# Patient Record
Sex: MALE | Race: NATIVE HAWAIIAN OR OTHER PACIFIC ISLANDER | NOT HISPANIC OR LATINO | Employment: FULL TIME | URBAN - METROPOLITAN AREA
[De-identification: names, ages, dates, MRNs, and addresses within clinical notes are randomized per-mention and may not be internally consistent; named-entity substitution may affect disease eponyms.]

---

## 2020-01-15 ENCOUNTER — TELEPHONE (OUTPATIENT)
Dept: SCHEDULING | Facility: IMAGING CENTER | Age: 45
End: 2020-01-15

## 2020-01-31 ENCOUNTER — APPOINTMENT (OUTPATIENT)
Dept: MEDICAL GROUP | Facility: MEDICAL CENTER | Age: 45
End: 2020-01-31

## 2021-09-26 ENCOUNTER — OFFICE VISIT (OUTPATIENT)
Dept: URGENT CARE | Facility: CLINIC | Age: 46
End: 2021-09-26
Payer: COMMERCIAL

## 2021-09-26 VITALS
RESPIRATION RATE: 16 BRPM | WEIGHT: 203 LBS | SYSTOLIC BLOOD PRESSURE: 150 MMHG | TEMPERATURE: 98.4 F | BODY MASS INDEX: 30.77 KG/M2 | HEIGHT: 68 IN | OXYGEN SATURATION: 98 % | HEART RATE: 70 BPM | DIASTOLIC BLOOD PRESSURE: 90 MMHG

## 2021-09-26 DIAGNOSIS — R05.9 COUGH IN ADULT: ICD-10-CM

## 2021-09-26 DIAGNOSIS — R03.0 ELEVATED BLOOD PRESSURE READING WITHOUT DIAGNOSIS OF HYPERTENSION: ICD-10-CM

## 2021-09-26 DIAGNOSIS — S40.021A ARM BRUISE, RIGHT, INITIAL ENCOUNTER: ICD-10-CM

## 2021-09-26 PROCEDURE — 99203 OFFICE O/P NEW LOW 30 MIN: CPT | Performed by: NURSE PRACTITIONER

## 2021-09-26 ASSESSMENT — ENCOUNTER SYMPTOMS
HEADACHES: 0
WHEEZING: 0
SORE THROAT: 0
CHILLS: 0
VOMITING: 0
NAUSEA: 0
ABDOMINAL PAIN: 0
SHORTNESS OF BREATH: 0
HEMOPTYSIS: 0
SPUTUM PRODUCTION: 0
MYALGIAS: 0
FEVER: 0
COUGH: 1
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
## 2021-10-20 ENCOUNTER — OFFICE VISIT (OUTPATIENT)
Dept: URGENT CARE | Facility: CLINIC | Age: 46
End: 2021-10-20
Payer: COMMERCIAL

## 2021-10-20 VITALS
SYSTOLIC BLOOD PRESSURE: 124 MMHG | HEIGHT: 68 IN | HEART RATE: 74 BPM | BODY MASS INDEX: 30.77 KG/M2 | DIASTOLIC BLOOD PRESSURE: 82 MMHG | OXYGEN SATURATION: 96 % | TEMPERATURE: 98.2 F | RESPIRATION RATE: 14 BRPM | WEIGHT: 203 LBS

## 2021-10-20 DIAGNOSIS — R05.9 COUGH: ICD-10-CM

## 2021-10-20 DIAGNOSIS — J30.2 SEASONAL ALLERGIES: ICD-10-CM

## 2021-10-20 PROCEDURE — 99214 OFFICE O/P EST MOD 30 MIN: CPT | Performed by: PHYSICIAN ASSISTANT

## 2021-10-20 RX ORDER — PREDNISONE 20 MG/1
40 TABLET ORAL DAILY
Qty: 10 TABLET | Refills: 0 | Status: SHIPPED | OUTPATIENT
Start: 2021-10-20 | End: 2021-10-25

## 2021-10-20 RX ORDER — ALBUTEROL SULFATE 90 UG/1
2 AEROSOL, METERED RESPIRATORY (INHALATION) EVERY 6 HOURS PRN
Qty: 8.5 G | Refills: 0 | Status: SHIPPED | OUTPATIENT
Start: 2021-10-20 | End: 2021-11-02

## 2021-10-20 ASSESSMENT — ENCOUNTER SYMPTOMS
DIAPHORESIS: 0
SHORTNESS OF BREATH: 1
WHEEZING: 0
MYALGIAS: 0
DIZZINESS: 0
CHILLS: 0
PALPITATIONS: 0
SINUS PAIN: 0
COUGH: 1
DIARRHEA: 0
ABDOMINAL PAIN: 0
STRIDOR: 0
NAUSEA: 0
VOMITING: 0
SPUTUM PRODUCTION: 0
SORE THROAT: 0
FEVER: 0
HEADACHES: 0

## 2021-10-20 NOTE — PROGRESS NOTES
Subjective:   Jabier Damico is a 46 y.o. male who presents for Cough (x 2 months.  Pt. states he tested Negative for Covid 19. Denies fever or chills.  Covid 19 vaccinated. )      HPI:  This is a very pleasant 46-year-old male presenting to the clinic with recurrent cough x2 months.  States he recently tested negative for COVID-19.   States his cough is intermittent.  Occasionally has a tickle in his throat and has coughing fits.  Has some mild shortness of breath after coughing fits.  Believes he may have a history of asthma however is not on 100% sure.  He does have an albuterol inhaler which does provide significant relief after coughing fits.  He states he is not producing any sputum.  He denies any fevers, chills or myalgias.  Does have some mild sinus congestion.  Does have a history of seasonal allergies for which he is to use Nasacort has not been using this lately.  Currently not taking any OTC medications for symptom.  No known ill contacts.  Has been vaccinated for COVID-19.    Review of Systems   Constitutional: Negative for chills, diaphoresis, fever and malaise/fatigue.   HENT: Negative for congestion, ear pain, sinus pain and sore throat.    Respiratory: Positive for cough and shortness of breath (After coughing fits.). Negative for sputum production, wheezing and stridor.    Cardiovascular: Negative for chest pain and palpitations.   Gastrointestinal: Negative for abdominal pain, diarrhea, nausea and vomiting.   Musculoskeletal: Negative for myalgias.   Skin: Negative for rash.   Neurological: Negative for dizziness and headaches.   Endo/Heme/Allergies: Positive for environmental allergies.       Medications:    • albuterol Aers  • azithromycin Tabs  • benzonatate Caps  • gabapentin Caps  • ibuprofen Tabs  • predniSONE Tabs  • triamcinolone    Allergies: Patient has no known allergies.    Problem List: Jabier Damico does not have any pertinent problems on file.    Surgical  "History:  Past Surgical History:   Procedure Laterality Date   • LAMINOTOMY     • OTHER CARDIAC SURGERY      one of his arteries was \"broken\" and the fixed it with another vein   • PB LAMINEC/FACETECT/FORAMIN,LUMBAR 1 SEG         Past Social Hx: Jabier Damico  reports that he is a non-smoker but has been exposed to tobacco smoke. He has never used smokeless tobacco. He reports current alcohol use. He reports that he does not use drugs.     Past Family Hx:  Jabier Damico family history is not on file.     Problem list, medications, and allergies reviewed by myself today in Epic.     Objective:     /82   Pulse 74   Temp 36.8 °C (98.2 °F) (Temporal)   Resp 14   Ht 1.727 m (5' 8\")   Wt 92.1 kg (203 lb)   SpO2 96%   BMI 30.87 kg/m²     Physical Exam  Constitutional:       General: He is not in acute distress.     Appearance: Normal appearance. He is not ill-appearing, toxic-appearing or diaphoretic.   HENT:      Head: Normocephalic and atraumatic.      Right Ear: Tympanic membrane, ear canal and external ear normal.      Left Ear: Tympanic membrane, ear canal and external ear normal.      Nose: Nose normal. No congestion or rhinorrhea.      Mouth/Throat:      Mouth: Mucous membranes are moist.      Pharynx: No oropharyngeal exudate or posterior oropharyngeal erythema.   Eyes:      Conjunctiva/sclera: Conjunctivae normal.   Cardiovascular:      Rate and Rhythm: Normal rate and regular rhythm.      Pulses: Normal pulses.      Heart sounds: Normal heart sounds.   Pulmonary:      Effort: Pulmonary effort is normal.      Breath sounds: Normal breath sounds. No wheezing, rhonchi or rales.      Comments: No coughing throughout exam.  Musculoskeletal:      Cervical back: Normal range of motion. No muscular tenderness.   Lymphadenopathy:      Cervical: No cervical adenopathy.   Skin:     General: Skin is warm and dry.      Capillary Refill: Capillary refill takes less than 2 seconds.   Neurological: "      Mental Status: He is alert.   Psychiatric:         Mood and Affect: Mood normal.         Thought Content: Thought content normal.           Assessment/Plan:     Comments/MDM:     • Recommend that the patient restart his allergy regimen.  OTC antihistamine recommended daily.  Nasacort encouraged.  Will provide the patient with a 5-day course of prednisone due to the length of the patient's symptoms.  I see no signs concerning for any bacterial etiology at this time.  Lung sounds are clear to auscultation.  No wheezes rhonchi or rales.  No concern for COVID-19 at this time.  Refill of albuterol provided.  Increase fluid intake.  Follow-up in clinic for any persistence or worsening of symptoms.     Diagnosis and associated orders:     1. Cough  predniSONE (DELTASONE) 20 MG Tab    albuterol 108 (90 Base) MCG/ACT Aero Soln inhalation aerosol   2. Seasonal allergies              Differential diagnosis, natural history, supportive care, and indications for immediate follow-up discussed.    Advised the patient to follow-up with the primary care physician for recheck, reevaluation, and consideration of further management.    Please note that this dictation was created using voice recognition software. I have made reasonable attempt to correct obvious errors, but I expect that there are errors of grammar and possibly content that I did not discover before finalizing the note.    This note was electronically signed by SHALOM Castanon PA-C

## 2021-11-02 ENCOUNTER — APPOINTMENT (OUTPATIENT)
Dept: RADIOLOGY | Facility: IMAGING CENTER | Age: 46
End: 2021-11-02
Attending: PHYSICIAN ASSISTANT
Payer: COMMERCIAL

## 2021-11-02 ENCOUNTER — OFFICE VISIT (OUTPATIENT)
Dept: URGENT CARE | Facility: CLINIC | Age: 46
End: 2021-11-02
Payer: COMMERCIAL

## 2021-11-02 ENCOUNTER — HOSPITAL ENCOUNTER (OUTPATIENT)
Facility: MEDICAL CENTER | Age: 46
End: 2021-11-02
Attending: PHYSICIAN ASSISTANT
Payer: COMMERCIAL

## 2021-11-02 VITALS
BODY MASS INDEX: 30.31 KG/M2 | HEART RATE: 86 BPM | OXYGEN SATURATION: 97 % | WEIGHT: 200 LBS | TEMPERATURE: 97.3 F | SYSTOLIC BLOOD PRESSURE: 132 MMHG | RESPIRATION RATE: 14 BRPM | HEIGHT: 68 IN | DIASTOLIC BLOOD PRESSURE: 86 MMHG

## 2021-11-02 DIAGNOSIS — R05.3 PERSISTENT COUGH: ICD-10-CM

## 2021-11-02 DIAGNOSIS — J40 BRONCHITIS: ICD-10-CM

## 2021-11-02 PROCEDURE — U0005 INFEC AGEN DETEC AMPLI PROBE: HCPCS

## 2021-11-02 PROCEDURE — U0003 INFECTIOUS AGENT DETECTION BY NUCLEIC ACID (DNA OR RNA); SEVERE ACUTE RESPIRATORY SYNDROME CORONAVIRUS 2 (SARS-COV-2) (CORONAVIRUS DISEASE [COVID-19]), AMPLIFIED PROBE TECHNIQUE, MAKING USE OF HIGH THROUGHPUT TECHNOLOGIES AS DESCRIBED BY CMS-2020-01-R: HCPCS

## 2021-11-02 PROCEDURE — 71046 X-RAY EXAM CHEST 2 VIEWS: CPT | Mod: TC | Performed by: PHYSICIAN ASSISTANT

## 2021-11-02 PROCEDURE — 99214 OFFICE O/P EST MOD 30 MIN: CPT | Performed by: PHYSICIAN ASSISTANT

## 2021-11-02 RX ORDER — AZITHROMYCIN 250 MG/1
TABLET, FILM COATED ORAL
Qty: 6 TABLET | Refills: 0 | Status: SHIPPED | OUTPATIENT
Start: 2021-11-02 | End: 2021-11-17

## 2021-11-02 RX ORDER — ALBUTEROL SULFATE 90 UG/1
2 AEROSOL, METERED RESPIRATORY (INHALATION) EVERY 6 HOURS PRN
Qty: 8.5 G | Refills: 0 | Status: SHIPPED | OUTPATIENT
Start: 2021-11-02 | End: 2021-11-17 | Stop reason: SDUPTHER

## 2021-11-02 RX ORDER — METHYLPREDNISOLONE 4 MG/1
TABLET ORAL
Qty: 21 TABLET | Refills: 0 | Status: SHIPPED | OUTPATIENT
Start: 2021-11-02 | End: 2021-11-17

## 2021-11-02 ASSESSMENT — ENCOUNTER SYMPTOMS
COUGH: 1
WHEEZING: 0
CHILLS: 0
SHORTNESS OF BREATH: 0
HEMOPTYSIS: 0
SPUTUM PRODUCTION: 0
PALPITATIONS: 0
FEVER: 0

## 2021-11-02 NOTE — PROGRESS NOTES
"  Subjective:   Jabier Damico is a 46 y.o. male who presents today with   Chief Complaint   Patient presents with   • Cough     x 12 days with S.O.B.. Denies fever or chills. Covid 19 vaccinated.      Cough  This is a new problem. The current episode started 1 to 4 weeks ago. The problem has been unchanged. The problem occurs every few minutes. Pertinent negatives include no chest pain, chills, fever, hemoptysis, shortness of breath or wheezing. He has tried a beta-agonist inhaler and oral steroids for the symptoms. The treatment provided mild relief. His past medical history is significant for bronchitis.   I personally donned proper PPE throughout visit today.   Patient was last seen on October 20 and at that time given an inhaler and oral steroids.  He states that his symptoms have slightly improved but he did not realize that he should not drink alcohol while taking the steroids and is unsure if this may have affected their efficacy.  No new or developing symptoms.    PMH:  has a past medical history of Chronic back pain, Hypertension, and Unspecified asthma(493.90).  MEDS:   Current Outpatient Medications:   •  methylPREDNISolone (MEDROL DOSEPAK) 4 MG Tablet Therapy Pack, Follow schedule on package instructions., Disp: 21 Tablet, Rfl: 0  •  azithromycin (ZITHROMAX) 250 MG Tab, Take 2 tablets by mouth on day one. Take one tablet by mouth the remaining days until gone, Disp: 6 Tablet, Rfl: 0  •  albuterol 108 (90 Base) MCG/ACT Aero Soln inhalation aerosol, Inhale 2 Puffs every 6 hours as needed for Shortness of Breath., Disp: 8.5 g, Rfl: 0  ALLERGIES: No Known Allergies  SURGHX:   Past Surgical History:   Procedure Laterality Date   • LAMINOTOMY     • OTHER CARDIAC SURGERY      one of his arteries was \"broken\" and the fixed it with another vein   • PB LAMINEC/FACETECT/FORAMIN,LUMBAR 1 SEG       SOCHX:  reports that he is a non-smoker but has been exposed to tobacco smoke. He has never used smokeless " "tobacco. He reports current alcohol use. He reports that he does not use drugs.  FH: Reviewed with patient, not pertinent to this visit.     Review of Systems   Constitutional: Negative for chills and fever.   Respiratory: Positive for cough. Negative for hemoptysis, sputum production, shortness of breath and wheezing.    Cardiovascular: Negative for chest pain and palpitations.      Objective:   /86   Pulse 86   Temp 36.3 °C (97.3 °F) (Temporal)   Resp 14   Ht 1.727 m (5' 8\")   Wt 90.7 kg (200 lb)   SpO2 97%   BMI 30.41 kg/m²   Physical Exam  Vitals and nursing note reviewed.   Constitutional:       General: He is not in acute distress.     Appearance: Normal appearance. He is well-developed. He is not ill-appearing or toxic-appearing.   HENT:      Head: Normocephalic and atraumatic.      Right Ear: Hearing normal.      Left Ear: Hearing normal.   Eyes:      Conjunctiva/sclera: Conjunctivae normal.   Cardiovascular:      Rate and Rhythm: Normal rate and regular rhythm.      Heart sounds: Normal heart sounds.   Pulmonary:      Effort: Pulmonary effort is normal. No respiratory distress.      Breath sounds: Normal breath sounds. No stridor. No wheezing, rhonchi or rales.   Musculoskeletal:      Comments: Normal movement in all 4 extremities   Skin:     General: Skin is warm and dry.   Neurological:      Mental Status: He is alert.      Coordination: Coordination normal.   Psychiatric:         Mood and Affect: Mood normal.     DX CHEST  FINDINGS:  LUNGS: The lungs are clear.     HEART and MEDIASTINUM: normal in size.     Pleura: There are no pleural effusion or pneumothoraces.     Osseous structures: There is thoracolumbar hardware present.        IMPRESSION:     No cardiopulmonary abnormality identified    Assessment/Plan:   Assessment    1. Persistent cough  - DX-CHEST-2 VIEWS; Future  - SARS-CoV-2 PCR (24 hour In-House): Collect NP swab in VTM; Future    2. Bronchitis  - methylPREDNISolone (MEDROL " DOSEPAK) 4 MG Tablet Therapy Pack; Follow schedule on package instructions.  Dispense: 21 Tablet; Refill: 0  - azithromycin (ZITHROMAX) 250 MG Tab; Take 2 tablets by mouth on day one. Take one tablet by mouth the remaining days until gone  Dispense: 6 Tablet; Refill: 0  - albuterol 108 (90 Base) MCG/ACT Aero Soln inhalation aerosol; Inhale 2 Puffs every 6 hours as needed for Shortness of Breath.  Dispense: 8.5 g; Refill: 0  Given persistence of cough and timeframe of the cough recommend we treat with a course of antibiotics to cover for any bacterial etiology and likely another round of steroids. We will rule out COVID today as patient states he has not had a test.   Discussed CDC guidelines including self isolation at home.   Patient encouraged to get plenty of rest, use OTC tylenol for pain/fever, and drink plenty of fluids.  Differential diagnosis, natural history, supportive care, and indications for immediate follow-up discussed.   Patient given instructions and understanding of medications and treatment.    If not improving in 3-5 days, F/U with PCP or return to  if symptoms worsen.    Patient agreeable to plan.  Greater than 30 minutes were spent reviewing patient's chart, examining and obtaining history from patient, and discussing plan of care.     Please note that this dictation was created using voice recognition software. I have made every reasonable attempt to correct obvious errors, but I expect that there are errors of grammar and possibly content that I did not discover before finalizing the note.    Hernan Newton PA-C

## 2021-11-03 DIAGNOSIS — R05.3 PERSISTENT COUGH: ICD-10-CM

## 2021-11-03 LAB
COVID ORDER STATUS COVID19: NORMAL
SARS-COV-2 RNA RESP QL NAA+PROBE: NOTDETECTED
SPECIMEN SOURCE: NORMAL

## 2021-11-16 LAB
ALBUMIN SERPL BCP-MCNC: 4.4 G/DL (ref 3.2–4.9)
ALBUMIN/GLOB SERPL: 1.8 G/DL
ALP SERPL-CCNC: 60 U/L (ref 30–99)
ALT SERPL-CCNC: 48 U/L (ref 2–50)
ANION GAP SERPL CALC-SCNC: 11 MMOL/L (ref 7–16)
AST SERPL-CCNC: 31 U/L (ref 12–45)
BASOPHILS # BLD AUTO: 0.5 % (ref 0–1.8)
BASOPHILS # BLD: 0.06 K/UL (ref 0–0.12)
BILIRUB SERPL-MCNC: 0.2 MG/DL (ref 0.1–1.5)
BUN SERPL-MCNC: 17 MG/DL (ref 8–22)
CALCIUM SERPL-MCNC: 9.1 MG/DL (ref 8.5–10.5)
CHLORIDE SERPL-SCNC: 106 MMOL/L (ref 96–112)
CO2 SERPL-SCNC: 22 MMOL/L (ref 20–33)
CREAT SERPL-MCNC: 0.85 MG/DL (ref 0.5–1.4)
EOSINOPHIL # BLD AUTO: 0.59 K/UL (ref 0–0.51)
EOSINOPHIL NFR BLD: 5.3 % (ref 0–6.9)
ERYTHROCYTE [DISTWIDTH] IN BLOOD BY AUTOMATED COUNT: 47.8 FL (ref 35.9–50)
GLOBULIN SER CALC-MCNC: 2.5 G/DL (ref 1.9–3.5)
GLUCOSE SERPL-MCNC: 115 MG/DL (ref 65–99)
HCT VFR BLD AUTO: 44.7 % (ref 42–52)
HGB BLD-MCNC: 14.8 G/DL (ref 14–18)
IMM GRANULOCYTES # BLD AUTO: 0.06 K/UL (ref 0–0.11)
IMM GRANULOCYTES NFR BLD AUTO: 0.5 % (ref 0–0.9)
LYMPHOCYTES # BLD AUTO: 3.31 K/UL (ref 1–4.8)
LYMPHOCYTES NFR BLD: 29.7 % (ref 22–41)
MCH RBC QN AUTO: 30.6 PG (ref 27–33)
MCHC RBC AUTO-ENTMCNC: 33.1 G/DL (ref 33.7–35.3)
MCV RBC AUTO: 92.4 FL (ref 81.4–97.8)
MONOCYTES # BLD AUTO: 0.7 K/UL (ref 0–0.85)
MONOCYTES NFR BLD AUTO: 6.3 % (ref 0–13.4)
NEUTROPHILS # BLD AUTO: 6.43 K/UL (ref 1.82–7.42)
NEUTROPHILS NFR BLD: 57.7 % (ref 44–72)
NRBC # BLD AUTO: 0 K/UL
NRBC BLD-RTO: 0 /100 WBC
PLATELET # BLD AUTO: 212 K/UL (ref 164–446)
PMV BLD AUTO: 10.1 FL (ref 9–12.9)
POTASSIUM SERPL-SCNC: 3.8 MMOL/L (ref 3.6–5.5)
PROT SERPL-MCNC: 6.9 G/DL (ref 6–8.2)
RBC # BLD AUTO: 4.84 M/UL (ref 4.7–6.1)
SODIUM SERPL-SCNC: 139 MMOL/L (ref 135–145)
WBC # BLD AUTO: 11.2 K/UL (ref 4.8–10.8)

## 2021-11-16 PROCEDURE — 80053 COMPREHEN METABOLIC PANEL: CPT

## 2021-11-16 PROCEDURE — 85025 COMPLETE CBC W/AUTO DIFF WBC: CPT

## 2021-11-16 PROCEDURE — 99284 EMERGENCY DEPT VISIT MOD MDM: CPT

## 2021-11-16 PROCEDURE — 93005 ELECTROCARDIOGRAM TRACING: CPT | Performed by: EMERGENCY MEDICINE

## 2021-11-17 ENCOUNTER — APPOINTMENT (OUTPATIENT)
Dept: RADIOLOGY | Facility: MEDICAL CENTER | Age: 46
End: 2021-11-17
Attending: EMERGENCY MEDICINE
Payer: COMMERCIAL

## 2021-11-17 ENCOUNTER — HOSPITAL ENCOUNTER (EMERGENCY)
Facility: MEDICAL CENTER | Age: 46
End: 2021-11-17
Attending: EMERGENCY MEDICINE
Payer: COMMERCIAL

## 2021-11-17 ENCOUNTER — TELEPHONE (OUTPATIENT)
Dept: MEDICAL GROUP | Facility: PHYSICIAN GROUP | Age: 46
End: 2021-11-17

## 2021-11-17 VITALS
SYSTOLIC BLOOD PRESSURE: 152 MMHG | WEIGHT: 204.37 LBS | DIASTOLIC BLOOD PRESSURE: 87 MMHG | OXYGEN SATURATION: 98 % | HEIGHT: 68 IN | TEMPERATURE: 98.1 F | BODY MASS INDEX: 30.97 KG/M2 | HEART RATE: 64 BPM | RESPIRATION RATE: 16 BRPM

## 2021-11-17 DIAGNOSIS — K76.9 LIVER LESION: ICD-10-CM

## 2021-11-17 DIAGNOSIS — J40 BRONCHITIS: ICD-10-CM

## 2021-11-17 DIAGNOSIS — R05.9 COUGH: Primary | ICD-10-CM

## 2021-11-17 LAB — NT-PROBNP SERPL IA-MCNC: 58 PG/ML (ref 0–125)

## 2021-11-17 PROCEDURE — 83880 ASSAY OF NATRIURETIC PEPTIDE: CPT

## 2021-11-17 PROCEDURE — 71275 CT ANGIOGRAPHY CHEST: CPT

## 2021-11-17 PROCEDURE — 700117 HCHG RX CONTRAST REV CODE 255: Performed by: EMERGENCY MEDICINE

## 2021-11-17 PROCEDURE — 71045 X-RAY EXAM CHEST 1 VIEW: CPT

## 2021-11-17 RX ORDER — PREDNISONE 20 MG/1
40 TABLET ORAL DAILY
Qty: 10 TABLET | Refills: 0 | Status: SHIPPED | OUTPATIENT
Start: 2021-11-17 | End: 2021-11-22

## 2021-11-17 RX ORDER — ALBUTEROL SULFATE 90 UG/1
2 AEROSOL, METERED RESPIRATORY (INHALATION) EVERY 6 HOURS PRN
Qty: 8.5 G | Refills: 2 | Status: SHIPPED | OUTPATIENT
Start: 2021-11-17 | End: 2022-04-21 | Stop reason: SDUPTHER

## 2021-11-17 RX ORDER — OMEPRAZOLE 40 MG/1
40 CAPSULE, DELAYED RELEASE ORAL DAILY
Qty: 30 CAPSULE | Refills: 0 | Status: SHIPPED | OUTPATIENT
Start: 2021-11-17

## 2021-11-17 RX ADMIN — IOHEXOL 40 ML: 350 INJECTION, SOLUTION INTRAVENOUS at 02:55

## 2021-11-17 NOTE — ED TRIAGE NOTES
"Chief Complaint   Patient presents with   • Shortness of Breath   • Cough     PT reports having increased SOB x past 3 months and has been seen at  with no real improvement.  RX and OTC meds not helping.     Blood Pressure (Abnormal) 161/80   Pulse 60   Temperature 37.2 °C (99 °F) (Temporal)   Respiration 17   Height 1.727 m (5' 8\")   Weight 92.7 kg (204 lb 5.9 oz)   Oxygen Saturation 97%   Body Mass Index 31.07 kg/m²     "
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
## 2021-11-17 NOTE — ED NOTES
"Pt discharged home with family member. Pt is A/Ox 4, ambulatory with a steady gait. IV discontinued and gauze placed, pt in possession of belongings. Pt provided discharge education and information pertaining to medications and follow up appointments. Discussed signs and symptoms to return to the ER, patient verbalized understanding. Pt received copy of discharge instructions and verbalized understanding.     /87   Pulse 64   Temp 36.7 °C (98.1 °F) (Temporal)   Resp 16   Ht 1.727 m (5' 8\")   Wt 92.7 kg (204 lb 5.9 oz)   SpO2 98%   BMI 31.07 kg/m²     "

## 2021-11-17 NOTE — TELEPHONE ENCOUNTER
Let patietn  know if it is something that needs to be seen for right away go to urgent care .  If not then see Dr. Olivas when he est care

## 2021-11-17 NOTE — TELEPHONE ENCOUNTER
Gerard sister called and needs to get brother in to see Dr. Olivas  But has not est care with you has an appointment on Jan 18. 2022.  Please advise

## 2022-01-18 ENCOUNTER — OFFICE VISIT (OUTPATIENT)
Dept: MEDICAL GROUP | Facility: PHYSICIAN GROUP | Age: 47
End: 2022-01-18
Payer: COMMERCIAL

## 2022-01-18 VITALS
DIASTOLIC BLOOD PRESSURE: 82 MMHG | SYSTOLIC BLOOD PRESSURE: 124 MMHG | OXYGEN SATURATION: 96 % | BODY MASS INDEX: 30.16 KG/M2 | WEIGHT: 199 LBS | HEIGHT: 68 IN | HEART RATE: 74 BPM | TEMPERATURE: 98.6 F | RESPIRATION RATE: 12 BRPM

## 2022-01-18 DIAGNOSIS — R05.3 CHRONIC COUGH: ICD-10-CM

## 2022-01-18 DIAGNOSIS — K76.9 LIVER LESION, RIGHT LOBE: ICD-10-CM

## 2022-01-18 DIAGNOSIS — Z23 NEED FOR DIPHTHERIA-TETANUS-PERTUSSIS (TDAP) VACCINE: ICD-10-CM

## 2022-01-18 DIAGNOSIS — Z13.220 SCREENING, LIPID: ICD-10-CM

## 2022-01-18 PROCEDURE — 99203 OFFICE O/P NEW LOW 30 MIN: CPT | Mod: 25 | Performed by: FAMILY MEDICINE

## 2022-01-18 PROCEDURE — 90471 IMMUNIZATION ADMIN: CPT | Performed by: FAMILY MEDICINE

## 2022-01-18 PROCEDURE — 90715 TDAP VACCINE 7 YRS/> IM: CPT | Performed by: FAMILY MEDICINE

## 2022-01-18 ASSESSMENT — PATIENT HEALTH QUESTIONNAIRE - PHQ9
CLINICAL INTERPRETATION OF PHQ2 SCORE: 2
5. POOR APPETITE OR OVEREATING: 0 - NOT AT ALL
SUM OF ALL RESPONSES TO PHQ QUESTIONS 1-9: 5

## 2022-01-18 ASSESSMENT — FIBROSIS 4 INDEX: FIB4 SCORE: 1.01

## 2022-01-19 NOTE — PROGRESS NOTES
"Sera Damico is a 46 y.o. male who presents with Establish Care and Other (Coughing since last year )            HPI     This is a 46-year-old [place] male who is here as a new patient to get established with me.  He was previously seeing Dr. Corley at the [facility] with a last visit in 2016.      He has chronic cough which started in August 2021.  He tested negative for COVID-19 in November 2021.  He was seen twice in urgent care for this problem in October 2021 and November 2021 and then at the ER in November 2021.  He was tested negative for COVID-19 in November 2021.  His chest x-ray came back no acute abnormal findings.  He had a CTA of the chest at the ER which came back negative for PE but showed 2.3 cm lobulated low-density lesion right hepatic lobe which could be a cyst or hemangioma with three-phase CT of the liver recommended.  His blood work done in the ER showed normal liver enzymes, normal kidney function, slightly elevated WBC count but normal H&H.  Patient was treated for the chronic cough with Zithromax Z-Edward and 2 courses of oral steroids.  He is also on albuterol HFA.  He said the cough is not as bad as before but not 100% gone.  Sometimes it wakes him up at night.  He denies any shortness of breath even with exercise.  He is a  and does a lot of exercises without shortness of breath.    No prior history of chronic lung disease including COPD or asthma.  He used to smoke about 1 cigarette/week for about 6 years and quit in February 2021.    Patient is due for Tdap.  He is also due for COVID-19 booster shot which he will get from the pharmacy.    I reviewed the following    Past Medical History:   Diagnosis Date   • Chronic back pain    • Hypertension    • Unspecified asthma(493.90)         Past Surgical History:   Procedure Laterality Date   • LAMINOTOMY  1997   • APPENDECTOMY     • OTHER CARDIAC SURGERY      one of his arteries was \"broken\" and the fixed it " with another vein   • PB LAMINEC/FACETECT/FORAMIN,LUMBAR 1 SEG         No Known Allergies    Current Outpatient Medications   Medication Sig Dispense Refill   • albuterol 108 (90 Base) MCG/ACT Aero Soln inhalation aerosol Inhale 2 Puffs every 6 hours as needed for Shortness of Breath. 8.5 g 2   • omeprazole (PRILOSEC) 40 MG delayed-release capsule Take 1 Capsule by mouth every day. 30 Capsule 0     No current facility-administered medications for this visit.        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   Tobacco Use   • Smoking status: Former Smoker     Years: 6.00     Start date: 2/1/2021   • Smokeless tobacco: Never Used   • Tobacco comment: 1 cig/week   Vaping Use   • Vaping Use: Never used   Substance and Sexual Activity   • Alcohol use: Yes     Comment: occ (1 drink/month)   • Drug use: No   • Sexual activity: Yes     Partners: Female   Other Topics Concern   • Not on file   Social History Narrative   • Not on file     Social Determinants of Health     Financial Resource Strain:    • Difficulty of Paying Living Expenses: Not on file   Food Insecurity:    • Worried About Running Out of Food in the Last Year: Not on file   • Ran Out of Food in the Last Year: Not on file   Transportation Needs:    • Lack of Transportation (Medical): Not on file   • Lack of Transportation (Non-Medical): Not on file   Physical Activity:    • Days of Exercise per Week: Not on file   • Minutes of Exercise per Session: Not on file   Stress:    • Feeling of Stress : Not on file   Social  "Connections:    • Frequency of Communication with Friends and Family: Not on file   • Frequency of Social Gatherings with Friends and Family: Not on file   • Attends Sikh Services: Not on file   • Active Member of Clubs or Organizations: Not on file   • Attends Club or Organization Meetings: Not on file   • Marital Status: Not on file   Intimate Partner Violence:    • Fear of Current or Ex-Partner: Not on file   • Emotionally Abused: Not on file   • Physically Abused: Not on file   • Sexually Abused: Not on file   Housing Stability:    • Unable to Pay for Housing in the Last Year: Not on file   • Number of Places Lived in the Last Year: Not on file   • Unstable Housing in the Last Year: Not on file        ROS    As per HPI, the rest are negative.         Objective     /82 (BP Location: Left arm, Patient Position: Sitting, BP Cuff Size: Adult)   Pulse 74   Temp 37 °C (98.6 °F) (Temporal)   Resp 12   Ht 1.727 m (5' 8\")   Wt 90.3 kg (199 lb)   SpO2 96%   BMI 30.26 kg/m²      Physical Exam  Vitals and nursing note reviewed.   Constitutional:       General: He is not in acute distress.     Appearance: He is well-developed. He is not diaphoretic.   HENT:      Head: Normocephalic and atraumatic.      Right Ear: External ear normal.      Left Ear: External ear normal.      Nose: Nose normal.      Mouth/Throat:      Pharynx: No oropharyngeal exudate.   Eyes:      General: No scleral icterus.        Right eye: No discharge.         Left eye: No discharge.      Conjunctiva/sclera: Conjunctivae normal.      Pupils: Pupils are equal, round, and reactive to light.   Neck:      Thyroid: No thyromegaly.      Vascular: No JVD.      Trachea: No tracheal deviation.   Cardiovascular:      Rate and Rhythm: Normal rate and regular rhythm.      Heart sounds: Normal heart sounds. No murmur heard.  No friction rub. No gallop.    Pulmonary:      Effort: Pulmonary effort is normal. No respiratory distress.      Breath " sounds: Normal breath sounds. No stridor. No wheezing or rales.   Chest:      Chest wall: No tenderness.   Abdominal:      General: Bowel sounds are normal. There is no distension.      Palpations: Abdomen is soft. There is no mass.      Tenderness: There is no abdominal tenderness. There is no guarding or rebound.      Hernia: No hernia is present.   Musculoskeletal:         General: No tenderness or deformity. Normal range of motion.      Cervical back: Normal range of motion and neck supple.   Lymphadenopathy:      Cervical: No cervical adenopathy.   Skin:     General: Skin is warm and dry.      Coloration: Skin is not pale.      Findings: No erythema or rash.   Neurological:      Mental Status: He is alert and oriented to person, place, and time.      Cranial Nerves: No cranial nerve deficit.      Motor: No abnormal muscle tone.      Coordination: Coordination normal.      Deep Tendon Reflexes: Reflexes are normal and symmetric. Reflexes normal.   Psychiatric:         Behavior: Behavior normal.         Thought Content: Thought content normal.         Judgment: Judgment normal.          I reviewed urgent care records and ER records.                        Assessment & Plan        1. Chronic cough  ongoing dry cough since August 2021 total of 5 months.  Chest x-ray and CTA of the chest without any acute problems.  He has been using albuterol HFA which is helping and he said his cough is not as bad as before.  I will get pulmonary function test to further evaluate.  Further recommendation will depend on results.  - PULMONARY FUNCTION TESTS -Test requested: Spirometry with-out & with Bronchodilator; Future    2. Liver lesion, right lobe  incidental finding of right hepatic lobe liver lesion.  We will do CT of the abdomen to further evaluate this lesion.  - CT-ABDOMEN WITH; Future  - Lipid Profile; Future  - Comp Metabolic Panel; Future    3. Screening, lipid  screening labs ordered.  - Lipid Profile; Future  - Comp  Metabolic Panel; Future    4. Need for diphtheria-tetanus-pertussis (Tdap) vaccine  Tdap was given.  - Tdap Vaccine =>8YO IM    Advised to get COVID-19 booster from the pharmacy.  No waiting period necessary between the Tdap and COVID-19 vaccine.    Return for follow-up in 3 months.      Please note that this dictation was created using voice recognition software. I have worked with consultants from the vendor as well as technical experts from [facility]  [employer] to optimize the interface. I have made every reasonable attempt to correct obvious errors, but I expect that there are errors of grammar and possibly content I did not discover before finalizing the note.

## 2022-02-18 ENCOUNTER — HOSPITAL ENCOUNTER (OUTPATIENT)
Dept: PULMONOLOGY | Facility: MEDICAL CENTER | Age: 47
End: 2022-02-18
Attending: FAMILY MEDICINE
Payer: COMMERCIAL

## 2022-02-18 DIAGNOSIS — R05.3 CHRONIC COUGH: ICD-10-CM

## 2022-02-18 PROCEDURE — 94060 EVALUATION OF WHEEZING: CPT

## 2022-02-18 PROCEDURE — 94060 EVALUATION OF WHEEZING: CPT | Mod: 26 | Performed by: INTERNAL MEDICINE

## 2022-02-18 RX ADMIN — Medication 2.5 MG: at 15:19

## 2022-02-18 ASSESSMENT — PULMONARY FUNCTION TESTS
FEV1/FVC: 60
FEV1/FVC_PERCENT_PREDICTED: 85
FEV1/FVC: 70.61
FEV1_LLN: 2.86
FVC_PREDICTED: 4.18
FVC_PERCENT_PREDICTED: 74
FEV1/FVC_PERCENT_LLN: 68.52
FEV1/FVC: 60.37
FVC_LLN: 3.49
FEV1/FVC_PERCENT_CHANGE: 16
FEV1/FVC_PERCENT_LLN: 68.52
FEV1/FVC_PERCENT_PREDICTED: 82
FEV1_PERCENT_CHANGE: -2
FEV1_PERCENT_PREDICTED: 56
FEV1/FVC_PREDICTED: 82.06
FEV1_PREDICTED: 3.43
FVC_LLN: 3.49
FEV1/FVC_PERCENT_CHANGE: -650
FEV1: 1.94
FEV1_LLN: 2.86
FEV1/FVC: 70.45
FEV1/FVC_PERCENT_PREDICTED: 86
FVC: 3.21
FEV1: 2.21
FEV1/FVC_PERCENT_PREDICTED: 73
FEV1_PERCENT_PREDICTED: 64
FVC_PERCENT_PREDICTED: 76
FVC: 3.13
FEV1_PERCENT_CHANGE: 13
FEV1/FVC_PERCENT_PREDICTED: 74

## 2022-02-20 NOTE — PROCEDURES
DATE OF SERVICE:  02/18/2022     PULMONARY FUNCTION TEST INTERPRETATION     REQUESTING PROVIDER:  Aracely Olivas MD     REASON FOR REQUEST:  Chronic cough.     INTERPRETATION:  1.  Acceptable and reproducible.  2.  FEV1 1.94 liters (56%), FVC 3.21 liters (76%), ratio 60%   pre-bronchodilator and post-bronchodilator was 70%.  3.  Flow volume loops consistent with obstruction.     IMPRESSION:  Even the post-bronchodilator FEV1/FVC ratio 70%.  The patient   does have obstructive lung disease, which is moderately severe with an FEV1 of   1.94 liters (56%) with a 13% response to 2.21 liters (64%).        ______________________________  MD NGA Rivera/FAITH    DD:  02/20/2022 13:30  DT:  02/20/2022 14:46    Job#:  [id number]    CC:Aracely Olivas MD(User)

## 2022-02-22 ENCOUNTER — TELEPHONE (OUTPATIENT)
Dept: MEDICAL GROUP | Facility: PHYSICIAN GROUP | Age: 47
End: 2022-02-22
Payer: COMMERCIAL

## 2022-02-22 DIAGNOSIS — J44.9 CHRONIC OBSTRUCTIVE PULMONARY DISEASE, UNSPECIFIED COPD TYPE (HCC): ICD-10-CM

## 2022-02-22 DIAGNOSIS — R05.3 CHRONIC COUGH: ICD-10-CM

## 2022-02-22 RX ORDER — IPRATROPIUM BROMIDE AND ALBUTEROL SULFATE 2.5; .5 MG/3ML; MG/3ML
3 SOLUTION RESPIRATORY (INHALATION) 4 TIMES DAILY
Qty: 120 EACH | Refills: 2 | Status: SHIPPED | OUTPATIENT
Start: 2022-02-22 | End: 2022-04-12 | Stop reason: SDUPTHER

## 2022-02-22 NOTE — TELEPHONE ENCOUNTER
Mukesh called about his results. Dr. Olivas stated that he has COPD.  Dr. Olivas will put in a referral for Pulmonology and for a nebulizer and medication to his pharmacy

## 2022-02-22 NOTE — TELEPHONE ENCOUNTER
Patient called to ask about results of his PFTs.  It showed moderately severe COPD.  Patient will be referred to our pulmonary medicine clinic for further evaluation and treatment.  Referral placed.

## 2022-02-23 ENCOUNTER — OFFICE VISIT (OUTPATIENT)
Dept: MEDICAL GROUP | Facility: PHYSICIAN GROUP | Age: 47
End: 2022-02-23
Payer: COMMERCIAL

## 2022-02-23 VITALS
DIASTOLIC BLOOD PRESSURE: 84 MMHG | OXYGEN SATURATION: 96 % | BODY MASS INDEX: 30.54 KG/M2 | SYSTOLIC BLOOD PRESSURE: 140 MMHG | TEMPERATURE: 98.8 F | HEIGHT: 68 IN | WEIGHT: 201.5 LBS | HEART RATE: 61 BPM

## 2022-02-23 DIAGNOSIS — J44.9 CHRONIC OBSTRUCTIVE PULMONARY DISEASE, UNSPECIFIED COPD TYPE (HCC): ICD-10-CM

## 2022-02-23 DIAGNOSIS — J30.81 ALLERGIC RHINITIS DUE TO ANIMAL HAIR AND DANDER: ICD-10-CM

## 2022-02-23 PROCEDURE — 99214 OFFICE O/P EST MOD 30 MIN: CPT | Performed by: FAMILY MEDICINE

## 2022-02-23 RX ORDER — FLUTICASONE PROPIONATE 50 MCG
2 SPRAY, SUSPENSION (ML) NASAL DAILY
Qty: 16 G | Refills: 2 | Status: SHIPPED | OUTPATIENT
Start: 2022-02-23 | End: 2022-02-24 | Stop reason: SDUPTHER

## 2022-02-23 RX ORDER — IPRATROPIUM BROMIDE AND ALBUTEROL 20; 100 UG/1; UG/1
1 SPRAY, METERED RESPIRATORY (INHALATION) 4 TIMES DAILY
Qty: 1 EACH | Refills: 1 | Status: SHIPPED | OUTPATIENT
Start: 2022-02-23 | End: 2022-03-13 | Stop reason: SDUPTHER

## 2022-02-23 ASSESSMENT — FIBROSIS 4 INDEX: FIB4 SCORE: 1.01

## 2022-02-23 NOTE — PROGRESS NOTES
"Subjective     Jabier Damico is a 46 y.o. male who presents with COPD (cough)            HPI     Patient is here because he said he got very short of breath last night and had difficulty breathing.  He started having runny nose, watery eyes, nasal congestion yesterday after he was holding a dirty dog owned by a friend.  He said he does not have seasonal allergies but he usually gets this type of reaction when he is around dirty dog.  Denies any phlegm, fever, chills, colored nasal discharge.    We recently did pulmonary function test to evaluate his chronic cough which came back consistent with moderately severe COPD.  Prior x-ray of the chest and CTA of the chest did not show any abnormal findings.  I have already referred him to the pulmonary specialist.  He asked for a nebulizer to use at home and we sent it to the pharmacy.  He got the DuoNeb solution but not the nebulizer machine.    He smoked about 1 cigarette/week for 6 years and quit in February 2021.  He had secondhand smoke growing up until age 19 through exposure from his father and sister    He has been using the albuterol HFA as needed for the cough.    Past medical history, past surgical history, family history reviewed-no changes    Social history reviewed-no changes    Allergies reviewed-no changes    Medications reviewed-no changes    ROS     As per HPI, the rest are negative.         Objective     /84 (BP Location: Left arm, Patient Position: Sitting, BP Cuff Size: Adult)   Pulse 61   Temp 37.1 °C (98.8 °F) (Temporal)   Ht 1.727 m (5' 8\")   Wt 91.4 kg (201 lb 8 oz)   SpO2 96%   BMI 30.64 kg/m²      Physical Exam     Examined alert, awake, oriented, not in distress    Eyes-positive watery eyes  Nose-very enlarged, erythematous inferior turbinates with total obstruction both nostrils, clear nasal discharge  Neck-supple, no lymphadenopathy, no thyromegaly  Lungs-clear to auscultation, no rales, no wheezes, good air " exchange  Heart-regular rate and rhythm, no murmur  Extremities-no edema, clubbing, cyanosis                      Assessment & Plan        1. Chronic obstructive pulmonary disease, unspecified COPD type (HCC)  Pulse ox on room air at 96%.  No wheezing, decreased air exchange, rhonchi on lung exam.  I will add Combivent Respimat 1 puff 4 times a day as his maintenance and ication.  Discussed at length to use the nebulizer machine with the DuoNeb solution 4 times a day but not to use the nebulizer anymore if he has to use the Combivent Respimat inhaler..  He can use albuterol HFA as needed only.  Made aware that the albuterol inhaler is a rescue inhaler and not his maintenance inhaler.  He will schedule appointment with pulmonary clinic once he gets the information through [employer].  - ipratropium-albuterol (COMBIVENT RESPIMAT)  MCG/ACT Aero Soln; Inhale 1 Puff 4 times a day.  Dispense: 1 Each; Refill: 1    2. Allergic rhinitis due to animal hair and dander  His symptoms and physical exam findings are consistent with allergic rhinitis probably due to exposure to animal hair and dander from the dirty dog.  Flonase nasal spray 2 sprays each nostril once daily.  He said he has Claritin at home and advised to take 1 pill daily with the Flonase nasal spray.  - fluticasone (FLONASE) 50 MCG/ACT nasal spray; Administer 2 Sprays into affected nostril(S) every day.  Dispense: 16 g; Refill: 2    Return as scheduled in April or sooner if needed.      Please note that this dictation was created using voice recognition software. I have worked with consultants from the vendor as well as technical experts from [employer] to optimize the interface. I have made every reasonable attempt to correct obvious errors, but I expect that there are errors of grammar and possibly content I did not discover before finalizing the note.

## 2022-02-24 ENCOUNTER — HOSPITAL ENCOUNTER (EMERGENCY)
Facility: MEDICAL CENTER | Age: 47
End: 2022-02-24
Attending: EMERGENCY MEDICINE
Payer: COMMERCIAL

## 2022-02-24 ENCOUNTER — TELEPHONE (OUTPATIENT)
Dept: MEDICAL GROUP | Facility: PHYSICIAN GROUP | Age: 47
End: 2022-02-24
Payer: COMMERCIAL

## 2022-02-24 ENCOUNTER — APPOINTMENT (OUTPATIENT)
Dept: RADIOLOGY | Facility: MEDICAL CENTER | Age: 47
End: 2022-02-24
Payer: COMMERCIAL

## 2022-02-24 VITALS
WEIGHT: 199.3 LBS | SYSTOLIC BLOOD PRESSURE: 183 MMHG | RESPIRATION RATE: 18 BRPM | BODY MASS INDEX: 30.2 KG/M2 | TEMPERATURE: 98.3 F | OXYGEN SATURATION: 100 % | HEIGHT: 68 IN | DIASTOLIC BLOOD PRESSURE: 86 MMHG | HEART RATE: 65 BPM

## 2022-02-24 DIAGNOSIS — J30.81 ALLERGIC RHINITIS DUE TO ANIMAL HAIR AND DANDER: ICD-10-CM

## 2022-02-24 DIAGNOSIS — J44.1 ACUTE EXACERBATION OF CHRONIC OBSTRUCTIVE PULMONARY DISEASE (COPD) (HCC): ICD-10-CM

## 2022-02-24 LAB
ALBUMIN SERPL BCP-MCNC: 4.8 G/DL (ref 3.2–4.9)
ALBUMIN/GLOB SERPL: 2.1 G/DL
ALP SERPL-CCNC: 69 U/L (ref 30–99)
ALT SERPL-CCNC: 50 U/L (ref 2–50)
ANION GAP SERPL CALC-SCNC: 11 MMOL/L (ref 7–16)
AST SERPL-CCNC: 32 U/L (ref 12–45)
BASOPHILS # BLD AUTO: 0.5 % (ref 0–1.8)
BASOPHILS # BLD: 0.06 K/UL (ref 0–0.12)
BILIRUB SERPL-MCNC: 0.3 MG/DL (ref 0.1–1.5)
BUN SERPL-MCNC: 12 MG/DL (ref 8–22)
CALCIUM SERPL-MCNC: 9.3 MG/DL (ref 8.5–10.5)
CHLORIDE SERPL-SCNC: 106 MMOL/L (ref 96–112)
CO2 SERPL-SCNC: 24 MMOL/L (ref 20–33)
CREAT SERPL-MCNC: 0.83 MG/DL (ref 0.5–1.4)
EKG IMPRESSION: NORMAL
EOSINOPHIL # BLD AUTO: 0.82 K/UL (ref 0–0.51)
EOSINOPHIL NFR BLD: 7.3 % (ref 0–6.9)
ERYTHROCYTE [DISTWIDTH] IN BLOOD BY AUTOMATED COUNT: 46.7 FL (ref 35.9–50)
GLOBULIN SER CALC-MCNC: 2.3 G/DL (ref 1.9–3.5)
GLUCOSE SERPL-MCNC: 106 MG/DL (ref 65–99)
HCT VFR BLD AUTO: 47.9 % (ref 42–52)
HGB BLD-MCNC: 16.1 G/DL (ref 14–18)
IMM GRANULOCYTES # BLD AUTO: 0.04 K/UL (ref 0–0.11)
IMM GRANULOCYTES NFR BLD AUTO: 0.4 % (ref 0–0.9)
LYMPHOCYTES # BLD AUTO: 3.15 K/UL (ref 1–4.8)
LYMPHOCYTES NFR BLD: 27.9 % (ref 22–41)
MCH RBC QN AUTO: 30.7 PG (ref 27–33)
MCHC RBC AUTO-ENTMCNC: 33.6 G/DL (ref 33.7–35.3)
MCV RBC AUTO: 91.4 FL (ref 81.4–97.8)
MONOCYTES # BLD AUTO: 0.84 K/UL (ref 0–0.85)
MONOCYTES NFR BLD AUTO: 7.4 % (ref 0–13.4)
NEUTROPHILS # BLD AUTO: 6.4 K/UL (ref 1.82–7.42)
NEUTROPHILS NFR BLD: 56.5 % (ref 44–72)
NRBC # BLD AUTO: 0 K/UL
NRBC BLD-RTO: 0 /100 WBC
PLATELET # BLD AUTO: 220 K/UL (ref 164–446)
PMV BLD AUTO: 9.9 FL (ref 9–12.9)
POTASSIUM SERPL-SCNC: 4.1 MMOL/L (ref 3.6–5.5)
PROT SERPL-MCNC: 7.1 G/DL (ref 6–8.2)
RBC # BLD AUTO: 5.24 M/UL (ref 4.7–6.1)
SARS-COV-2 RNA RESP QL NAA+PROBE: NOTDETECTED
SODIUM SERPL-SCNC: 141 MMOL/L (ref 135–145)
SPECIMEN SOURCE: NORMAL
WBC # BLD AUTO: 11.3 K/UL (ref 4.8–10.8)

## 2022-02-24 PROCEDURE — 99284 EMERGENCY DEPT VISIT MOD MDM: CPT

## 2022-02-24 PROCEDURE — 93005 ELECTROCARDIOGRAM TRACING: CPT | Performed by: EMERGENCY MEDICINE

## 2022-02-24 PROCEDURE — 85025 COMPLETE CBC W/AUTO DIFF WBC: CPT

## 2022-02-24 PROCEDURE — 93005 ELECTROCARDIOGRAM TRACING: CPT

## 2022-02-24 PROCEDURE — 71045 X-RAY EXAM CHEST 1 VIEW: CPT

## 2022-02-24 PROCEDURE — 80053 COMPREHEN METABOLIC PANEL: CPT

## 2022-02-24 PROCEDURE — 700101 HCHG RX REV CODE 250: Performed by: EMERGENCY MEDICINE

## 2022-02-24 PROCEDURE — U0005 INFEC AGEN DETEC AMPLI PROBE: HCPCS

## 2022-02-24 PROCEDURE — U0003 INFECTIOUS AGENT DETECTION BY NUCLEIC ACID (DNA OR RNA); SEVERE ACUTE RESPIRATORY SYNDROME CORONAVIRUS 2 (SARS-COV-2) (CORONAVIRUS DISEASE [COVID-19]), AMPLIFIED PROBE TECHNIQUE, MAKING USE OF HIGH THROUGHPUT TECHNOLOGIES AS DESCRIBED BY CMS-2020-01-R: HCPCS

## 2022-02-24 PROCEDURE — 700111 HCHG RX REV CODE 636 W/ 250 OVERRIDE (IP): Performed by: EMERGENCY MEDICINE

## 2022-02-24 RX ORDER — FLUTICASONE PROPIONATE 50 MCG
2 SPRAY, SUSPENSION (ML) NASAL DAILY
Qty: 16 G | Refills: 2 | Status: SHIPPED | OUTPATIENT
Start: 2022-02-24

## 2022-02-24 RX ORDER — PREDNISONE 20 MG/1
40 TABLET ORAL DAILY
Qty: 10 TABLET | Refills: 0 | Status: SHIPPED | OUTPATIENT
Start: 2022-02-24 | End: 2022-03-01

## 2022-02-24 RX ORDER — IPRATROPIUM BROMIDE AND ALBUTEROL SULFATE 2.5; .5 MG/3ML; MG/3ML
3 SOLUTION RESPIRATORY (INHALATION) ONCE
Status: COMPLETED | OUTPATIENT
Start: 2022-02-24 | End: 2022-02-24

## 2022-02-24 RX ORDER — PREDNISONE 20 MG/1
40 TABLET ORAL ONCE
Status: COMPLETED | OUTPATIENT
Start: 2022-02-24 | End: 2022-02-24

## 2022-02-24 RX ADMIN — IPRATROPIUM BROMIDE AND ALBUTEROL SULFATE 3 ML: .5; 3 SOLUTION RESPIRATORY (INHALATION) at 03:15

## 2022-02-24 RX ADMIN — PREDNISONE 40 MG: 20 TABLET ORAL at 03:06

## 2022-02-24 ASSESSMENT — FIBROSIS 4 INDEX: FIB4 SCORE: 1.01

## 2022-02-24 NOTE — ED NOTES
"Pt resting in bed. Respirations even and unlabored. Pt states he has had a dry cough, but it is not new. Reports \"itchy\" throat. Has not picked up Rx from PCP visit yesterday yet.  "

## 2022-02-24 NOTE — TELEPHONE ENCOUNTER
Phone Number Called: [phone]    Call outcome: Left detailed message for patient. Informed to call back with any additional questions.    Message: Please call Nurse Foley (with [facility]) at [phone] to schedule a follow-up visit with your primary care provider following your emergency room visit on 2/24/2022.

## 2022-02-24 NOTE — TELEPHONE ENCOUNTER
Patient called and was asking about the name of the nebulizer machine.  Told him and also told him he could go and see if his pharmacy or any other pharmacy has one.

## 2022-02-24 NOTE — ED TRIAGE NOTES
"Chief Complaint   Patient presents with   • Shortness of Breath     Pt reports increasing SOB that started earlier today.  Pt reports he has had an unproductive cough for almost a year now. Pt reports that he has experienced SOB like this for a year but reports that he thinks his allergies made it worse today. Pt reports he is vaccinated for covid. Pt reports no past medical respiratory history but was prescribed an inhale for the shortness of breath       45 yo M to triage for above complaint. GCS=15. SOB protocol ordered.     Pt is alert and oriented, speaking in full sentences, follows commands and responds appropriately to questions. NAD. Resp are even and unlabored.      [facility] [place] Pt educated on triage process. Pt encouraged to alert staff for any changes.     Patient and staff wearing appropriate PPE    /108   Pulse 96   Temp 35.9 °C (96.6 °F) (Temporal)   Resp 18   Ht 1.727 m (5' 8\")   Wt 90.4 kg (199 lb 4.7 oz)   SpO2 94% Comment: Room air  BMI 30.30 kg/m²     "

## 2022-02-24 NOTE — ED PROVIDER NOTES
ED Provider Note    Scribed for Joe Gtz M.D. by Lurdes De La Cruz. 2/24/2022  2:37 AM    Primary care provider: Aracely Olivas M.D.  Means of arrival: Walk-in  History obtained from: Patient  History limited by: None    CHIEF COMPLAINT  Chief Complaint   Patient presents with   • Shortness of Breath     Pt reports increasing SOB that started earlier today.  Pt reports he has had an unproductive cough for almost a year now. Pt reports that he has experienced SOB like this for a year but reports that he thinks his allergies made it worse today. Pt reports he is vaccinated for covid. Pt reports no past medical respiratory history but was prescribed an inhale for the shortness of breath       HPI  Jabier Damico is a 46 y.o. male who presents to the Emergency Department for evaluation of worsening shortness of breath. He has been experiencing shortness of breath for about a year now but today this was significantly worse and he thinks this is due to allergies. Yesterday he was seen by his PCP for COPD and was prescribed Flonase and an inhaler. He is also feeling congested and having a dry cough. No fevers, leg swelling, or hemoptysis. He previously smoked but no longer does. No known COVID exposure. The patient is vaccinated against COVID-19.     REVIEW OF SYSTEMS  Pertinent positives include: shortness of breath, cough, and congestion. Pertinent negatives include: no fevers, leg swelling, or hemoptysis. See history of present illness. All other systems are negative.     PAST MEDICAL HISTORY   has a past medical history of Chronic back pain, Hypertension, and Unspecified asthma(493.90).    SURGICAL HISTORY   has a past surgical history that includes laminec/facetect/foramin,lumbar 1 seg; other cardiac surgery; appendectomy; and laminotomy (1997).    SOCIAL HISTORY  Social History     Tobacco Use   • Smoking status: Former Smoker     Years: 6.00     Start date: 2/1/2021   • Smokeless tobacco: Never Used   •  "Tobacco comment: 1 cig/week   Vaping Use   • Vaping Use: Never used   Substance Use Topics   • Alcohol use: Not Currently     Comment: occ (1 drink/month)   • Drug use: No      Social History     Substance and Sexual Activity   Drug Use No       FAMILY HISTORY  Family History   Problem Relation Age of Onset   • Hypertension Mother    • Hyperlipidemia Mother    • Hyperlipidemia Father    • Diabetes Father         Prediabetes   • Gout Father    • Heart Disease Brother         pacemaker       CURRENT MEDICATIONS  Home Medications     Reviewed by Aixa Roth R.N. (Registered Nurse) on 02/24/22 at 0045  Med List Status: Partial   Medication Last Dose Status   albuterol 108 (90 Base) MCG/ACT Aero Soln inhalation aerosol  Active   fluticasone (FLONASE) 50 MCG/ACT nasal spray  Active   ipratropium-albuterol (COMBIVENT RESPIMAT)  MCG/ACT Aero Soln  Active   ipratropium-albuterol (DUONEB) 0.5-2.5 (3) MG/3ML nebulizer solution  Active   omeprazole (PRILOSEC) 40 MG delayed-release capsule  Active                ALLERGIES  No Known Allergies    PHYSICAL EXAM  VITAL SIGNS: /108   Pulse 96   Temp 35.9 °C (96.6 °F) (Temporal)   Resp 18   Ht 1.727 m (5' 8\")   Wt 90.4 kg (199 lb 4.7 oz)   SpO2 94% Comment: Room air  BMI 30.30 kg/m²     Constitutional: Alert in no apparent distress.  HENT: No signs of trauma, Bilateral external ears normal, Nose normal. Uvula midline.   Eyes: Pupils are equal and reactive, Conjunctiva normal, Non-icteric.   Neck: Normal range of motion, No tenderness, Supple, No stridor.   Lymphatic: No lymphadenopathy noted.   Cardiovascular: Regular rate and rhythm, no murmurs.   Thorax & Lungs: Mild expiratory wheezing right greater than left, No respiratory distress, No chest tenderness.   Abdomen:  Soft, No tenderness, No peritoneal signs, No masses, No pulsatile masses.   Skin: Warm, Dry, No erythema, No rash.   Back: No bony tenderness, No CVA tenderness.   Extremities: Intact distal " pulses, No edema, No tenderness, No cyanosis.  Musculoskeletal: Good range of motion in all major joints. No tenderness to palpation or major deformities noted.   Neurologic: Alert , Normal motor function, Normal sensory function, No focal deficits noted.   Psychiatric: Affect normal, Judgment normal, Mood normal.     DIAGNOSTIC STUDIES / PROCEDURES    LABS  Labs Reviewed   CBC WITH DIFFERENTIAL - Abnormal; Notable for the following components:       Result Value    WBC 11.3 (*)     MCHC 33.6 (*)     Eosinophils 7.30 (*)     Eos (Absolute) 0.82 (*)     All other components within normal limits   COMP METABOLIC PANEL - Abnormal; Notable for the following components:    Glucose 106 (*)     All other components within normal limits   ESTIMATED GFR   SARS-COV-2, PCR (IN-HOUSE)      All labs reviewed by me.    EKG   Report   Date Value Ref Range Status   2022       [facility] Emergency [facility]    Test Date:  2022  Pt Name:    CARLOS BOLES               Department: ER  MRN:        [id number]                      Room:  Gender:     Male                         Technician: [id number]  :        1975                   Requested By:ER TRIAGE PROTOCOL  Order #:    [id number]                    Reading MD:    Measurements  Intervals                                Axis  Rate:       51                           P:          83  VA:         176                          QRS:        58  QRSD:       90                           T:          70  QT:         448  QTc:        413    Interpretive Statements  SINUS BRADYCARDIA  CONSIDER LEFT VENTRICULAR HYPERTROPHY  Compared to ECG 2015 09:40:50  No significant changes               RADIOLOGY  DX-CHEST-PORTABLE (1 VIEW)   Final Result         1.  No acute cardiopulmonary disease.        The radiologist's interpretation of all radiological studies have been reviewed by me.    COURSE & MEDICAL DECISION MAKING  Nursing notes, VS, PMSFHx reviewed in  chart.    46 y.o. male p/w chief complaint of shortness of breath.    2:37 AM Patient seen and examined at bedside.      I verified that the patient was wearing a mask and I was wearing appropriate PPE every time I entered the room. The patient's mask was on the patient at all times during my encounter except for a brief view of the oropharynx.     The differential diagnoses include but are not limited to:     PT given duoneb and steriods upon arrival to ED  pt w/ no e/o ptx or pna on CXR  hx and PE c/w prior COPD exacerbation, no pain or pressure concerning for ACS and unremarkable ECG  COVID test obtained given cough and congestion  Sx improved prior to d/c, pt given RX of steriods and plan to f/u w/ PCP in next few days    The patient will return for new or worsening symptoms and is stable at the time of discharge.    The patient is referred to a primary physician for blood pressure management, diabetic screening, and for all other preventative health concerns.    DISPOSITION:  Patient will be discharged home in stable condition.    FOLLOW UP:  Aracely Olivas M.D.  [address]  [facility] [address]  [facility] [address]  [phone]          [facility], Emergency Dept  [address] [facility] [address]  [phone]          OUTPATIENT MEDICATIONS:  Discharge Medication List as of 2/24/2022  3:27 AM      START taking these medications    Details   predniSONE (DELTASONE) 20 MG Tab Take 2 Tablets by mouth every day for 5 days., Disp-10 Tablet, R-0, Normal             FINAL IMPRESSION  1. Acute exacerbation of chronic obstructive pulmonary disease (COPD) ([facility])    2. Allergic rhinitis due to animal hair and dander        Lurdes FREIRE (Obdulio), am scribing for, and in the presence of, Joe Gtz M.D..    Electronically signed by: Lurdes Cameron), 2/24/2022    Joe FREIRE M.D. personally performed the services described in this documentation, as scribed by Lurdes De La Cruz in my  presence, and it is both accurate and complete.    The note accurately reflects work and decisions made by me.  Joe Gtz M.D.  2/24/2022  7:27 AM

## 2022-02-24 NOTE — ED NOTES
Pt ambulated to room with steady gait. Dressed on gown. Placed on monitor. Call light in reach. Up for ERP to see.

## 2022-02-24 NOTE — ED NOTES
Pt reports feeling some improvement in SOB after nebulizer. Pt states he feels comfortable going home. AVS discussed with patient. Ambulatory to [facility]. Given spacer for inhaler

## 2022-02-28 ENCOUNTER — TELEPHONE (OUTPATIENT)
Dept: MEDICAL GROUP | Facility: PHYSICIAN GROUP | Age: 47
End: 2022-02-28
Payer: COMMERCIAL

## 2022-03-01 NOTE — TELEPHONE ENCOUNTER
Patient called and wanted to know if he needed to get a refill of prednisone as he is almost out.  He stated that he was feeling somewhat better but not like he thinks he should.

## 2022-03-08 ENCOUNTER — TELEPHONE (OUTPATIENT)
Dept: MEDICAL GROUP | Facility: PHYSICIAN GROUP | Age: 47
End: 2022-03-08
Payer: COMMERCIAL

## 2022-03-08 DIAGNOSIS — J44.9 CHRONIC OBSTRUCTIVE PULMONARY DISEASE, UNSPECIFIED COPD TYPE (HCC): ICD-10-CM

## 2022-03-13 DIAGNOSIS — J44.9 CHRONIC OBSTRUCTIVE PULMONARY DISEASE, UNSPECIFIED COPD TYPE (HCC): ICD-10-CM

## 2022-03-14 RX ORDER — IPRATROPIUM BROMIDE AND ALBUTEROL 20; 100 UG/1; UG/1
1 SPRAY, METERED RESPIRATORY (INHALATION) 4 TIMES DAILY
Qty: 1 EACH | Refills: 2 | Status: SHIPPED | OUTPATIENT
Start: 2022-03-14

## 2022-03-16 ENCOUNTER — TELEPHONE (OUTPATIENT)
Dept: MEDICAL GROUP | Facility: PHYSICIAN GROUP | Age: 47
End: 2022-03-16
Payer: COMMERCIAL

## 2022-03-30 DIAGNOSIS — J44.9 CHRONIC OBSTRUCTIVE PULMONARY DISEASE, UNSPECIFIED COPD TYPE (HCC): ICD-10-CM

## 2022-04-04 DIAGNOSIS — J44.9 CHRONIC OBSTRUCTIVE PULMONARY DISEASE, UNSPECIFIED COPD TYPE (HCC): ICD-10-CM

## 2022-04-12 DIAGNOSIS — R05.3 CHRONIC COUGH: ICD-10-CM

## 2022-04-12 DIAGNOSIS — J44.9 CHRONIC OBSTRUCTIVE PULMONARY DISEASE, UNSPECIFIED COPD TYPE (HCC): ICD-10-CM

## 2022-04-12 RX ORDER — IPRATROPIUM BROMIDE AND ALBUTEROL SULFATE 2.5; .5 MG/3ML; MG/3ML
3 SOLUTION RESPIRATORY (INHALATION) 4 TIMES DAILY
Qty: 120 EACH | Refills: 5 | Status: SHIPPED | OUTPATIENT
Start: 2022-04-12

## 2022-04-21 ENCOUNTER — OFFICE VISIT (OUTPATIENT)
Dept: MEDICAL GROUP | Facility: PHYSICIAN GROUP | Age: 47
End: 2022-04-21
Payer: COMMERCIAL

## 2022-04-21 VITALS
TEMPERATURE: 97.7 F | WEIGHT: 197.75 LBS | OXYGEN SATURATION: 97 % | SYSTOLIC BLOOD PRESSURE: 160 MMHG | BODY MASS INDEX: 29.97 KG/M2 | DIASTOLIC BLOOD PRESSURE: 70 MMHG | RESPIRATION RATE: 18 BRPM | HEIGHT: 68 IN | HEART RATE: 80 BPM

## 2022-04-21 DIAGNOSIS — I10 ESSENTIAL HYPERTENSION: ICD-10-CM

## 2022-04-21 DIAGNOSIS — M72.2 PLANTAR FASCIITIS OF RIGHT FOOT: ICD-10-CM

## 2022-04-21 DIAGNOSIS — K42.9 UMBILICAL HERNIA WITHOUT OBSTRUCTION AND WITHOUT GANGRENE: ICD-10-CM

## 2022-04-21 DIAGNOSIS — J44.9 CHRONIC OBSTRUCTIVE PULMONARY DISEASE, UNSPECIFIED COPD TYPE (HCC): ICD-10-CM

## 2022-04-21 PROCEDURE — 99214 OFFICE O/P EST MOD 30 MIN: CPT | Performed by: FAMILY MEDICINE

## 2022-04-21 RX ORDER — ALBUTEROL SULFATE 90 UG/1
2 AEROSOL, METERED RESPIRATORY (INHALATION) EVERY 6 HOURS PRN
Qty: 8.5 G | Refills: 2 | Status: SHIPPED | OUTPATIENT
Start: 2022-04-21 | End: 2022-06-07 | Stop reason: SDUPTHER

## 2022-04-21 ASSESSMENT — FIBROSIS 4 INDEX: FIB4 SCORE: 0.99

## 2022-04-21 NOTE — LETTER
April 21, 2022         Patient: Jabier Damico   YOB: 1975   Date of Visit: 4/21/2022           To Whom it May Concern:    Jabier Damico was seen in my clinic on 4/21/2022.     If you have any questions or concerns, please don't hesitate to call.        Sincerely,           Aracely Olivas M.D.  Electronically Signed      No Repair - Repaired With Adjacent Surgical Defect Text (Leave Blank If You Do Not Want): After the excision the defect was repaired concurrently with another surgical defect which was in close approximation.

## 2022-04-21 NOTE — PROGRESS NOTES
"Subjective     Jabier Damico is a 46 y.o. male who presents with Cough (COPD ) and Foot Problem (RT foot px )            HPI     Patient is here for follow-up.  He also has new concerns.    In terms of his COPD, he said his shortness of breath is much improved.  He thinks the problem is triggered by exposure to dust in his work environment.  He has an appointment to see the pulmonary specialist on May 18.  He is using the portable nebulizer and he was able to get the one with the battery pack so he can bring it with him.  He has been using the DuoNeb solution.  He also has the albuterol HFA as a rescue inhaler.  I sent a prescription for Combivent Respimat but he said he does not have it as the pharmacy did not tell him that it was available for him.  For now he thinks everything is doing well and under control.    He has history of hypertension with on and off elevation of the blood pressure.  He said he declined to be on blood pressure medication when he was seeing his prior PCP back in 2016.  Today he said he just came from the gym so he thinks this was causing the blood pressure elevation.     He complains of pain of the right heel at the bottom of the foot when he gets up to walk.  This has been ongoing for about a month now.    He also has a bulge in his umbilicus that has been there for a year.  He thinks it got worse with his coughing and with his physical work.  Denies pain or discomfort.    Past medical history, past surgical history, family history reviewed-no changes    Social history reviewed-no changes    Allergies reviewed-no changes    Medications reviewed-no changes    ROS     As per HPI, the rest are negative.           Objective     /70   Pulse 80   Temp 36.5 °C (97.7 °F) (Temporal)   Resp 18   Ht 1.727 m (5' 8\")   Wt 89.7 kg (197 lb 12 oz)   SpO2 97%   BMI 30.07 kg/m²      Physical Exam     Examined alert, awake, oriented, not in distress    Neck-supple, no lymphadenopathy, " no thyromegaly  Lungs-clear to auscultation, no rales, no wheezes  Heart-regular rate and rhythm, no murmur  Abdomen- good bowel sounds, soft, positive nonreducible umbilical hernia  Extremities-no edema, clubbing, cyanosis, right foot exam-strong pedal pulse, no skin changes, no open sores, very tender on palpation at the insertion of the plantar fascia into the heel                      Assessment & Plan        1. Chronic obstructive pulmonary disease, unspecified COPD type (HCC)  He needed refills of the albuterol HFA and these were sent to the pharmacy.  He can continue using nebulizer with a DuoNeb solution as needed.  He will keep his appointment with the pulmonary specialist in a month.  - albuterol 108 (90 Base) MCG/ACT Aero Soln inhalation aerosol; Inhale 2 Puffs every 6 hours as needed for Shortness of Breath.  Dispense: 8.5 g; Refill: 2    2. Essential hypertension  He will start monitoring his blood pressure at home and keep a record.  He will buy a blood pressure machine.  Advised to watch the salt intake, exercise regularly and lose weight.  I will reevaluate in 3 months.    3. Plantar fasciitis of right foot  Symptoms and physical exam findings consistent with plantar fasciitis of the right foot.  We will do conservative measures with stretching exercises, warm compresses, NSAIDs as needed.    4. Umbilical hernia without obstruction and without gangrene  At this time he is asymptomatic.  I offered him referral to the surgeon for repair but he wants to hold off for now.    Follow-up in 3 months.      Please note that this dictation was created using voice recognition software. I have worked with consultants from the vendor as well as technical experts from [employer] to optimize the interface. I have made every reasonable attempt to correct obvious errors, but I expect that there are errors of grammar and possibly content I did not discover before finalizing the note.

## 2022-05-18 ENCOUNTER — OFFICE VISIT (OUTPATIENT)
Dept: SLEEP MEDICINE | Facility: MEDICAL CENTER | Age: 47
End: 2022-05-18
Payer: COMMERCIAL

## 2022-05-18 VITALS
HEART RATE: 90 BPM | WEIGHT: 197 LBS | SYSTOLIC BLOOD PRESSURE: 132 MMHG | HEIGHT: 68 IN | OXYGEN SATURATION: 97 % | BODY MASS INDEX: 29.86 KG/M2 | DIASTOLIC BLOOD PRESSURE: 70 MMHG

## 2022-05-18 DIAGNOSIS — D72.10 EOSINOPHILIA, UNSPECIFIED TYPE: ICD-10-CM

## 2022-05-18 DIAGNOSIS — J45.40 MODERATE PERSISTENT ASTHMA WITHOUT COMPLICATION: ICD-10-CM

## 2022-05-18 PROCEDURE — 99204 OFFICE O/P NEW MOD 45 MIN: CPT | Performed by: INTERNAL MEDICINE

## 2022-05-18 RX ORDER — FLUTICASONE PROPIONATE AND SALMETEROL XINAFOATE 115; 21 UG/1; UG/1
2 AEROSOL, METERED RESPIRATORY (INHALATION) 2 TIMES DAILY
Qty: 3 EACH | Refills: 3 | Status: SHIPPED | OUTPATIENT
Start: 2022-05-18 | End: 2022-05-23

## 2022-05-18 ASSESSMENT — ENCOUNTER SYMPTOMS
SPUTUM PRODUCTION: 0
WEIGHT LOSS: 0
SHORTNESS OF BREATH: 1
VOMITING: 0
TREMORS: 0
STRIDOR: 0
NAUSEA: 0
PND: 0
MYALGIAS: 0
FALLS: 0
EYE REDNESS: 0
FEVER: 0
EYE DISCHARGE: 0
DOUBLE VISION: 0
WHEEZING: 1
COUGH: 1
NECK PAIN: 0
HEMOPTYSIS: 0
PHOTOPHOBIA: 0
DIARRHEA: 0
DEPRESSION: 0
HEARTBURN: 0
SORE THROAT: 0
BLURRED VISION: 0
SINUS PAIN: 0
SPEECH CHANGE: 0
CONSTIPATION: 0
ORTHOPNEA: 0
CHILLS: 0
ABDOMINAL PAIN: 0
HEADACHES: 0
EYE PAIN: 0
WEAKNESS: 0
CLAUDICATION: 0
BACK PAIN: 0
PALPITATIONS: 0
FOCAL WEAKNESS: 0
DIAPHORESIS: 0
DIZZINESS: 0

## 2022-05-18 ASSESSMENT — FIBROSIS 4 INDEX: FIB4 SCORE: 0.99

## 2022-05-18 NOTE — LETTER
May 18, 2022        Jabier Damico    To Whom It May Concern;    The patient, Jabier Damico, was seen by myself in pulmonary medicine clinic for respiratory condition which has recently been worsened by excessive exposure to dust and small particles. I am recommended he be excused from work temporarily for the next two days to avoid further exacerbating his respiratory conditions. He will be able to return to work once condition improves and his current symptoms are managed. Thank you for your consideration in this matter.                       Carley Quinn M.D.
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
## 2022-05-20 DIAGNOSIS — J45.40 MODERATE PERSISTENT ASTHMA WITHOUT COMPLICATION: ICD-10-CM

## 2022-05-23 RX ORDER — FLUTICASONE PROPIONATE AND SALMETEROL XINAFOATE 115; 21 UG/1; UG/1
2 AEROSOL, METERED RESPIRATORY (INHALATION) 2 TIMES DAILY
Qty: 36 EACH | Refills: 3 | Status: SHIPPED | OUTPATIENT
Start: 2022-05-23 | End: 2023-04-13 | Stop reason: SDUPTHER

## 2022-05-23 NOTE — TELEPHONE ENCOUNTER
Pharmacy comment: Alternative Requested:COPAY IS $1200, PLEASE SUBSTITUTE      Have we ever prescribed this med? Yes.  If yes, what date? 05/18/22    Last OV: 05/18/22 sana Quinn    Next OV: 11/01/22 with Dr quinn    DX: Moderate persistent asthma without complication (J45.40)    Medications:    Requested Prescriptions     Pending Prescriptions Disp Refills   • ADVAIR -21 MCG/ACT inhaler [Pharmacy Med Name: ADVAIR -21 MCG INHALER] 36 Each 3     Sig: INHALE 2 PUFFS 2 TIMES A DAY. USE WITH SPACER. RINSE MOUTH AFTER EACH USE.

## 2022-05-26 NOTE — TELEPHONE ENCOUNTER
Called and spoke Pharmacy. They didn't run it thought the insurance. They are going to now and it is covered. They are going to get it ready.    Called and spoke with pt. Pt notified.

## 2023-01-30 ENCOUNTER — OFFICE VISIT (OUTPATIENT)
Dept: URGENT CARE | Facility: CLINIC | Age: 48
End: 2023-01-30
Payer: COMMERCIAL

## 2023-01-30 VITALS
OXYGEN SATURATION: 96 % | DIASTOLIC BLOOD PRESSURE: 82 MMHG | HEIGHT: 68 IN | WEIGHT: 195 LBS | HEART RATE: 82 BPM | SYSTOLIC BLOOD PRESSURE: 130 MMHG | TEMPERATURE: 97.5 F | BODY MASS INDEX: 29.55 KG/M2 | RESPIRATION RATE: 16 BRPM

## 2023-01-30 DIAGNOSIS — M10.9 ACUTE GOUT OF RIGHT KNEE, UNSPECIFIED CAUSE: ICD-10-CM

## 2023-01-30 PROCEDURE — 99213 OFFICE O/P EST LOW 20 MIN: CPT | Performed by: STUDENT IN AN ORGANIZED HEALTH CARE EDUCATION/TRAINING PROGRAM

## 2023-01-30 RX ORDER — PREDNISONE 20 MG/1
30 TABLET ORAL DAILY
Qty: 8 TABLET | Refills: 0 | Status: SHIPPED | OUTPATIENT
Start: 2023-01-30 | End: 2023-02-04

## 2023-01-30 ASSESSMENT — FIBROSIS 4 INDEX: FIB4 SCORE: 1.01
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
## 2023-01-30 NOTE — LETTER
[facility]  [address] [address]     January 30, 2023    Patient: Jabier Damico   YOB: 1975   Date of Visit: 1/30/2023       To Whom It May Concern:    Jabier Damico was seen and treated in our department on 1/30/2023.  Patient is excuse from work on 1/30/2023 through 2/1/2023.    Sincerely,     Sander Simeon P.A.-C.

## 2023-02-08 ENCOUNTER — OFFICE VISIT (OUTPATIENT)
Dept: URGENT CARE | Facility: CLINIC | Age: 48
End: 2023-02-08
Payer: COMMERCIAL

## 2023-02-08 VITALS
HEART RATE: 91 BPM | RESPIRATION RATE: 16 BRPM | HEIGHT: 68 IN | TEMPERATURE: 97.2 F | DIASTOLIC BLOOD PRESSURE: 70 MMHG | OXYGEN SATURATION: 98 % | SYSTOLIC BLOOD PRESSURE: 116 MMHG | BODY MASS INDEX: 29.4 KG/M2 | WEIGHT: 194 LBS

## 2023-02-08 DIAGNOSIS — M10.9 ACUTE GOUT OF LEFT KNEE, UNSPECIFIED CAUSE: ICD-10-CM

## 2023-02-08 PROCEDURE — 99214 OFFICE O/P EST MOD 30 MIN: CPT | Performed by: PHYSICIAN ASSISTANT

## 2023-02-08 RX ORDER — METHYLPREDNISOLONE 4 MG/1
TABLET ORAL
Qty: 21 TABLET | Refills: 0 | Status: SHIPPED | OUTPATIENT
Start: 2023-02-08

## 2023-02-08 RX ORDER — KETOROLAC TROMETHAMINE 30 MG/ML
30 INJECTION, SOLUTION INTRAMUSCULAR; INTRAVENOUS ONCE
Status: COMPLETED | OUTPATIENT
Start: 2023-02-08 | End: 2023-02-08

## 2023-02-08 RX ADMIN — KETOROLAC TROMETHAMINE 30 MG: 30 INJECTION, SOLUTION INTRAMUSCULAR; INTRAVENOUS at 14:15

## 2023-02-08 ASSESSMENT — ENCOUNTER SYMPTOMS
WEAKNESS: 0
VOMITING: 0
DIZZINESS: 0
HEADACHES: 0
CHILLS: 0
FEVER: 0
TINGLING: 0
FALLS: 0
NAUSEA: 0

## 2023-02-08 ASSESSMENT — FIBROSIS 4 INDEX: FIB4 SCORE: 1.01

## 2023-02-08 NOTE — LETTER
[facility]  [facility] [address] [address]     February 8, 2023    Patient: Jabier Damico   YOB: 1975   Date of Visit: 2/8/2023       To Whom It May Concern:    Jabier Damico was seen and treated in our department on 2/8/2023.  Please excuse the patient's absence from work on 2/8/2023 and 2/9/2023.  Experiencing an acute gout flare.  Call questions or concerns at [phone].    Sincerely,     Golden Castanon P.A.-C.

## 2023-02-08 NOTE — PROGRESS NOTES
Subjective:     CHIEF COMPLAINT  Chief Complaint   Patient presents with    Gout     Left knee       HPI  Jabier Damico is a very pleasant 47 y.o. male who presents to the clinic with acute atraumatic left knee pain starting this morning.  Patient was recently seen and evaluated in clinic on 1/30/2023 for the same complaint.  At that time he was diagnosed with gout and completed a course of oral steroids.  States he noted full resolution while on the steroids.  Swelling and pain fully resolved.  This morning he woke up and again noted swelling and mild redness to left knee.  Pain is aggravated with any palpation as well as weightbearing.  Denies any skin break or abrasion.  No fevers, chills, nausea or vomiting.  States he has been eating lots of red meat recently.  No medication changes.    REVIEW OF SYSTEMS  Review of Systems   Constitutional:  Negative for chills, fever and malaise/fatigue.   Gastrointestinal:  Negative for nausea and vomiting.   Musculoskeletal:  Positive for joint pain. Negative for falls.   Skin:  Negative for rash.   Neurological:  Negative for dizziness, tingling, weakness and headaches.     PAST MEDICAL HISTORY  Patient Active Problem List    Diagnosis Date Noted    Essential hypertension 04/19/2016    Elevated liver enzymes 07/22/2014    Vitamin D deficiency disease 07/22/2014    Back pain 06/17/2014       SURGICAL HISTORY   has a past surgical history that includes laminec/facetect/foramin,lumbar 1 seg; other cardiac surgery; appendectomy; and laminotomy (1997).    ALLERGIES  No Known Allergies    CURRENT MEDICATIONS  Home Medications       Reviewed by Golden Castanon P.A.-C. (Physician Assistant) on 02/08/23 at 1356  Med List Status: <None>     Medication Last Dose Status   ADVAIR -21 MCG/ACT inhaler PRN Active   albuterol 108 (90 Base) MCG/ACT Aero Soln inhalation aerosol PRN Active   fluticasone (FLONASE) 50 MCG/ACT nasal spray Not Taking Active   ipratropium-albuterol  "(COMBIVENT RESPIMAT)  MCG/ACT Aero Soln Not Taking Active   ipratropium-albuterol (DUONEB) 0.5-2.5 (3) MG/3ML nebulizer solution Not Taking Active   Misc. Devices Misc Not Taking Active   omeprazole (PRILOSEC) 40 MG delayed-release capsule Not Taking Active                    SOCIAL HISTORY  Social History     Tobacco Use    Smoking status: Former     Years: 6.00     Types: Cigarettes     Start date: 2/1/2021    Smokeless tobacco: Never    Tobacco comments:     1 cig/week   Vaping Use    Vaping Use: Never used   Substance and Sexual Activity    Alcohol use: Not Currently     Comment: occ (1 drink/month)    Drug use: No    Sexual activity: Yes     Partners: Female       FAMILY HISTORY  Family History   Problem Relation Age of Onset    Hypertension Mother     Hyperlipidemia Mother     Hyperlipidemia Father     Diabetes Father         Prediabetes    Gout Father     Heart Disease Brother         pacemaker          Objective:     VITAL SIGNS: /70 (BP Location: Right arm, Patient Position: Sitting, BP Cuff Size: Adult long)   Pulse 91   Temp 36.2 °C (97.2 °F) (Temporal)   Resp 16   Ht 1.727 m (5' 8\")   Wt 88 kg (194 lb)   SpO2 98%   BMI 29.50 kg/m²     PHYSICAL EXAM  Physical Exam  HENT:      Head: Normocephalic and atraumatic.   Eyes:      Conjunctiva/sclera: Conjunctivae normal.   Cardiovascular:      Rate and Rhythm: Normal rate and regular rhythm.      Pulses: Normal pulses.      Heart sounds: Normal heart sounds.   Pulmonary:      Effort: Pulmonary effort is normal.   Musculoskeletal:      Cervical back: Normal range of motion.      Comments: Left knee: Mild swelling present.  Very minimal overlying erythema.  No skin break or abrasion present.  Tenderness to palpation over the supra patellar bursa.  Patient maintains full knee flexion and extension.  No bony tenderness to palpation.   Neurological:      General: No focal deficit present.      Mental Status: He is alert and oriented to person, " place, and time. Mental status is at baseline.       Assessment/Plan:     1. Acute gout of left knee, unspecified cause  - ketorolac (TORADOL) injection 30 mg  - methylPREDNISolone (MEDROL DOSEPAK) 4 MG Tablet Therapy Pack; Follow schedule on package instructions.  Dispense: 21 Tablet; Refill: 0      MDM/Comments:    -Monitor for possible diet correlation and modify diet. Limit alcohol intake.  -Oral hydration.  -RICE Therapy: Rest, Ice, Compression, Elevation  -30 mg IM Toradol provided in clinic.  Medrol Dosepak sent to pharmacy.  -Can also take tylenol as directed for pain.   -Discussed other potential causes including septic joint.  At this time I am lowly suspicious.  Recently had a gout flare that responded well to steroids.  Discussed red flag symptoms that would warrant emergent follow-up in ED.    Follow up with PCP. Follow up for persistent or worsening symptoms, fever, chills, signs of infection. Emergently for severe uncontrolled pain, neurovascular compromise (decreased sensation, motion, or circulation).       Differential diagnosis, natural history, supportive care, and indications for immediate follow-up discussed. All questions answered. Patient agrees with the plan of care.    Follow-up as needed if symptoms worsen or fail to improve to PCP, Urgent care or Emergency Room.    I have personally reviewed prior external notes and test results pertinent to today's visit.  I have independently reviewed and interpreted all diagnostics ordered during this urgent care acute visit.   Discussed management options (risks,benefits, and alternatives to treatment). Pt expresses understanding and the treatment plan was agreed upon. Questions were encouraged and answered to pt's satisfaction.    Please note that this dictation was created using voice recognition software. I have made a reasonable attempt to correct obvious errors, but I expect that there are errors of grammar and possibly content that I did not  discover before finalizing the note.

## 2023-12-14 DIAGNOSIS — J45.40 MODERATE PERSISTENT ASTHMA WITHOUT COMPLICATION: ICD-10-CM

## 2023-12-15 RX ORDER — FLUTICASONE PROPIONATE AND SALMETEROL XINAFOATE 115; 21 UG/1; UG/1
2 AEROSOL, METERED RESPIRATORY (INHALATION) 2 TIMES DAILY
Qty: 3 EACH | Refills: 3 | Status: SHIPPED | OUTPATIENT
Start: 2023-12-15

## 2023-12-15 NOTE — TELEPHONE ENCOUNTER
Have we ever prescribed this med? Yes.  If yes, what date? 6/15/2023    Last OV: 5/18/2022- DR. LARES     Next OV: NONE     DX: Moderate persistent asthma without complication [J45.40]     Medications: fluticasone-salmeterol (ADVAIR HFA) 115-21 MCG/ACT inhaler

## 2024-04-23 DIAGNOSIS — J45.40 MODERATE PERSISTENT ASTHMA WITHOUT COMPLICATION: ICD-10-CM

## 2024-04-23 RX ORDER — FLUTICASONE PROPIONATE AND SALMETEROL XINAFOATE 115; 21 UG/1; UG/1
2 AEROSOL, METERED RESPIRATORY (INHALATION) 2 TIMES DAILY
Qty: 3 EACH | Refills: 3 | Status: SHIPPED | OUTPATIENT
Start: 2024-04-23

## 2024-05-04 DIAGNOSIS — J45.40 MODERATE PERSISTENT ASTHMA WITHOUT COMPLICATION: ICD-10-CM

## 2024-05-06 RX ORDER — FLUTICASONE PROPIONATE AND SALMETEROL XINAFOATE 115; 21 UG/1; UG/1
2 AEROSOL, METERED RESPIRATORY (INHALATION) 2 TIMES DAILY
Qty: 3 EACH | Refills: 3 | Status: SHIPPED | OUTPATIENT
Start: 2024-05-06 | End: 2024-05-22 | Stop reason: SDUPTHER

## 2024-05-22 DIAGNOSIS — J45.40 MODERATE PERSISTENT ASTHMA WITHOUT COMPLICATION: ICD-10-CM

## 2024-05-22 RX ORDER — FLUTICASONE PROPIONATE AND SALMETEROL XINAFOATE 115; 21 UG/1; UG/1
2 AEROSOL, METERED RESPIRATORY (INHALATION) 2 TIMES DAILY
Qty: 3 EACH | Refills: 3 | Status: SHIPPED | OUTPATIENT
Start: 2024-05-22

## 2024-06-01 DIAGNOSIS — J45.40 MODERATE PERSISTENT ASTHMA WITHOUT COMPLICATION: ICD-10-CM

## 2024-06-04 RX ORDER — FLUTICASONE PROPIONATE AND SALMETEROL XINAFOATE 115; 21 UG/1; UG/1
2 AEROSOL, METERED RESPIRATORY (INHALATION) 2 TIMES DAILY
Qty: 3 EACH | Refills: 3 | Status: SHIPPED | OUTPATIENT
Start: 2024-06-04

## 2024-07-14 DIAGNOSIS — J45.40 MODERATE PERSISTENT ASTHMA WITHOUT COMPLICATION: ICD-10-CM

## 2024-07-16 RX ORDER — FLUTICASONE PROPIONATE AND SALMETEROL XINAFOATE 115; 21 UG/1; UG/1
2 AEROSOL, METERED RESPIRATORY (INHALATION) 2 TIMES DAILY
Qty: 3 EACH | Refills: 3 | Status: SHIPPED | OUTPATIENT
Start: 2024-07-16